# Patient Record
Sex: FEMALE | Race: WHITE | NOT HISPANIC OR LATINO | Employment: FULL TIME | URBAN - METROPOLITAN AREA
[De-identification: names, ages, dates, MRNs, and addresses within clinical notes are randomized per-mention and may not be internally consistent; named-entity substitution may affect disease eponyms.]

---

## 2023-12-05 ENCOUNTER — APPOINTMENT (OUTPATIENT)
Dept: OBSTETRICS AND GYNECOLOGY | Facility: CLINIC | Age: 23
End: 2023-12-05
Payer: COMMERCIAL

## 2023-12-11 ENCOUNTER — OFFICE VISIT (OUTPATIENT)
Dept: OBSTETRICS AND GYNECOLOGY | Facility: CLINIC | Age: 23
End: 2023-12-11
Payer: COMMERCIAL

## 2023-12-11 VITALS
DIASTOLIC BLOOD PRESSURE: 76 MMHG | WEIGHT: 293 LBS | HEIGHT: 66 IN | BODY MASS INDEX: 47.09 KG/M2 | SYSTOLIC BLOOD PRESSURE: 118 MMHG

## 2023-12-11 DIAGNOSIS — L68.0 HIRSUTISM: ICD-10-CM

## 2023-12-11 DIAGNOSIS — N92.6 IRREGULAR PERIODS: Primary | ICD-10-CM

## 2023-12-11 PROCEDURE — 1036F TOBACCO NON-USER: CPT | Performed by: OBSTETRICS & GYNECOLOGY

## 2023-12-11 PROCEDURE — 99385 PREV VISIT NEW AGE 18-39: CPT | Performed by: OBSTETRICS & GYNECOLOGY

## 2023-12-11 RX ORDER — LISDEXAMFETAMINE DIMESYLATE CAPSULES 20 MG/1
20 CAPSULE ORAL EVERY MORNING
COMMUNITY

## 2023-12-14 ENCOUNTER — LAB (OUTPATIENT)
Dept: LAB | Facility: LAB | Age: 23
End: 2023-12-14
Payer: COMMERCIAL

## 2023-12-14 ENCOUNTER — ANCILLARY PROCEDURE (OUTPATIENT)
Dept: RADIOLOGY | Facility: CLINIC | Age: 23
End: 2023-12-14
Payer: COMMERCIAL

## 2023-12-14 DIAGNOSIS — N92.6 IRREGULAR PERIODS: ICD-10-CM

## 2023-12-14 DIAGNOSIS — L68.0 HIRSUTISM: ICD-10-CM

## 2023-12-14 LAB
DHEA-S SERPL-MCNC: 164 UG/DL (ref 65–395)
EST. AVERAGE GLUCOSE BLD GHB EST-MCNC: 105 MG/DL
FSH SERPL-ACNC: 10.4 IU/L
GLUCOSE P FAST SERPL-MCNC: 89 MG/DL (ref 74–99)
HBA1C MFR BLD: 5.3 %
INSULIN P FAST SERPL-ACNC: 29 UIU/ML (ref 3–25)
LH SERPL-ACNC: 7.4 IU/L
PROLACTIN SERPL-MCNC: 7.3 UG/L (ref 3–20)
TSH SERPL-ACNC: 2.43 MIU/L (ref 0.44–3.98)

## 2023-12-14 PROCEDURE — 82947 ASSAY GLUCOSE BLOOD QUANT: CPT

## 2023-12-14 PROCEDURE — 84402 ASSAY OF FREE TESTOSTERONE: CPT

## 2023-12-14 PROCEDURE — 76856 US EXAM PELVIC COMPLETE: CPT | Performed by: RADIOLOGY

## 2023-12-14 PROCEDURE — 84146 ASSAY OF PROLACTIN: CPT

## 2023-12-14 PROCEDURE — 83002 ASSAY OF GONADOTROPIN (LH): CPT

## 2023-12-14 PROCEDURE — 83036 HEMOGLOBIN GLYCOSYLATED A1C: CPT

## 2023-12-14 PROCEDURE — 82627 DEHYDROEPIANDROSTERONE: CPT

## 2023-12-14 PROCEDURE — 83525 ASSAY OF INSULIN: CPT

## 2023-12-14 PROCEDURE — 84443 ASSAY THYROID STIM HORMONE: CPT

## 2023-12-14 PROCEDURE — 76856 US EXAM PELVIC COMPLETE: CPT

## 2023-12-14 PROCEDURE — 83001 ASSAY OF GONADOTROPIN (FSH): CPT

## 2023-12-14 PROCEDURE — 36415 COLL VENOUS BLD VENIPUNCTURE: CPT

## 2023-12-18 ENCOUNTER — TELEMEDICINE (OUTPATIENT)
Dept: OBSTETRICS AND GYNECOLOGY | Facility: CLINIC | Age: 23
End: 2023-12-18
Payer: COMMERCIAL

## 2023-12-18 VITALS — WEIGHT: 293 LBS | BODY MASS INDEX: 52.29 KG/M2

## 2023-12-18 DIAGNOSIS — E16.1 HYPERINSULINEMIA: ICD-10-CM

## 2023-12-18 DIAGNOSIS — N92.6 IRREGULAR PERIODS: Primary | ICD-10-CM

## 2023-12-18 DIAGNOSIS — L68.0 HIRSUTISM: ICD-10-CM

## 2023-12-18 PROCEDURE — 99213 OFFICE O/P EST LOW 20 MIN: CPT | Performed by: OBSTETRICS & GYNECOLOGY

## 2023-12-18 RX ORDER — DEXTROAMPHETAMINE SACCHARATE, AMPHETAMINE ASPARTATE, DEXTROAMPHETAMINE SULFATE AND AMPHETAMINE SULFATE 1.25; 1.25; 1.25; 1.25 MG/1; MG/1; MG/1; MG/1
5 TABLET ORAL DAILY
COMMUNITY
End: 2024-04-15 | Stop reason: WASHOUT

## 2023-12-18 RX ORDER — METFORMIN HYDROCHLORIDE 500 MG/1
500 TABLET ORAL
Qty: 30 TABLET | Refills: 2 | Status: SHIPPED | OUTPATIENT
Start: 2023-12-18 | End: 2023-12-19

## 2023-12-18 NOTE — PROGRESS NOTES
Subjective   Patient ID: Luna Alarcon is a 23 y.o. female who presents for Metrorrhagia (Patient states has been getting cramps like she is going to start her period but does not).  MARY ANN Carrasco is on telehealth with her mom for follow-up visit.  We reviewed today her pelvic ultrasound and labs.  As she only had abdominal scan done through which the uterus was normal and no fibroids but unable to see the ovaries.  No transvaginal exam was done.  Her labs all within normal limits her FSH to LH ratio is normal .  The only abnormality noticed was higher level of insulin and the ratio of glucose to insulin low at 3.  Review of Systems   All other systems reviewed and are negative.      Objective   Physical Exam  Constitutional:       Appearance: Normal appearance.   Pulmonary:      Effort: Pulmonary effort is normal.   Neurological:      Mental Status: She is alert.   Psychiatric:         Mood and Affect: Mood normal.         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  Have also recommended an nutritional referral to help her monitor her calories and lose weight.  Recommended 16% weight loss before we reevaluate labs.  We did discuss a pelvic ultrasound which was inadequate ovaries were not seen and so this can be followed up in 3 to 6 months if necessary with a transvaginal ultrasound.  Both patient and mom were present during this telehealth visit and their questions were answered.  Follow-up is in 1 to 2 months.  27 mins with majority of the time counseling, review results and making recommendation and coordinating care.        David Chávez MD  12/18/23 1:28 PM

## 2023-12-19 RX ORDER — METFORMIN HYDROCHLORIDE 500 MG/1
TABLET ORAL
Qty: 90 TABLET | Refills: 3 | Status: SHIPPED | OUTPATIENT
Start: 2023-12-19 | End: 2024-03-13 | Stop reason: SDUPTHER

## 2023-12-20 LAB
TESTOSTERONE FREE (CHAN): 3.4 PG/ML (ref 0.1–6.4)
TESTOSTERONE,TOTAL,LC-MS/MS: 16 NG/DL (ref 2–45)

## 2024-01-05 ENCOUNTER — NUTRITION (OUTPATIENT)
Dept: NUTRITION | Facility: CLINIC | Age: 24
End: 2024-01-05
Payer: COMMERCIAL

## 2024-01-05 DIAGNOSIS — E16.1 HYPERINSULINEMIA: ICD-10-CM

## 2024-01-05 DIAGNOSIS — Z71.3 DIETARY COUNSELING AND SURVEILLANCE: Primary | ICD-10-CM

## 2024-01-05 PROCEDURE — 97802 MEDICAL NUTRITION INDIV IN: CPT

## 2024-01-05 NOTE — PROGRESS NOTES
"NUTRITION ASSESSMENT NOTE    Reason for Nutrition Visit:  Pt is a 23 y.o. female being seen in person for an initial appointment at [facility] referred for OBESITY complicating childbirth and HYPERINSULINEMIA.     Pt states they seek  from dietitian for help addressing her weight status.     Anthropometrics:  Wt: 324#, 147kg  Ht: 5'6\"  BMI: 52.29 kg/m2      Past Medical Hx:  Patient Active Problem List   Diagnosis    Irregular periods    Obesity complicating childbirth    Hirsutism    Hyperinsulinemia        Lab Results   Component Value Date    HGBA1C 5.3 12/14/2023          Food and Nutrition Hx:  Pt admits to having struggled with weight her whole life. Pt was active in sports for years, but tore both ACL's during high school and lost the physical activity component. Pt then began college and her diet pattern degraded to the point where weight began to climb.     Pt currently starting walking at the gym x 30-60 minutes, plans to conduct it 3 days/week.     Pt feels like the biggest issue contributing to wt status is large portions, the types of foods, and lack of exercise.     DIETARY RECALL: *Pt consumes 2 meals/day on average*  Wake-up: Varied  Meal 1: Most days, varied times, Eggs, grilled chicken in jan wrap  Meal 2: Most days, Restaurant food  Meal 3: Everyday, Tater tot casserole, pork roast, mashed potatoes, corn, salad, chicken, roasted peppers, cabbage  Snacks: Hot Cheeto's  Beverages: Water x 64oz+ daily      NUTRITIONAL ARTIFACTS:  Typically has dinner style foods for breakfast    Allergies: None  Intolerance: None  Appetite: Good  Intake: >75%  GI Symptoms : None Frequency: absent  Swallowing Difficulty: No problems with swallowing  Dentition : own -- Good condition    Supplements: Denies -- Formerly fish oil and magensium    Energy Levels: Stable    Food Preparation: Patient  Cooking Skills/Barriers: None reported -- Likes to cook  Grocery Shopping: Patient      Nutrition Focused Physical " Exam:    Performed/Deferred: Deferred as pt visually appears well-nourished with no signs of malnutrition    Estimated Energy Needs:    WEIGHT MAINTENANCE: MSJ, 2255 x 1.2 (AF) = 2700 kcal/day  WEIGHT LOSS: 25-30 kcal/kg IBW = 0438-5313 kcal/day  PROTEIN Needs: 0.8-1 g/kg = 120-145 g/day    Nutrition Diagnosis:    Diagnosis Statement 1:  Diagnosis Status: New  Diagnosis : Obese related to  excessive energy intake over a prolonged period of time  as evidenced by  patient BMI of 52.29 kg/m2    Diagnosis Statement 2:  Diagnosis Status: New  Diagnosis : Inadequate fiber intake  related to food and nutrition related knowledge deficit concerning desirable quantities of fiber as evidenced by estimated intake of fiber that is insufficient when compared to recommended amounts (38 g/day for men and 25 g/day for women)    Diagnosis Statement 3:   Diagnosis Status: New  Diagnosis : Inadequate protein intake  related to food and nutrition related knowledge deficit concerning appropriate amount and type of dietary fat and/or protein as evidenced by  dietary recall reflecting intake of protein at levels <75% daily recommended needs x > 3 months    Nutrition Interventions:  Anti-Inflammatory Diet, Decreased Carbohydrate Diet, Decreased Fat Diet, Increased Fiber Diet, Increased Soluble Fiber, Increased Omega-3 Diet, Increased Protein Diet, Low Saturated Fat Diet, Mediterranean Diet, and Plant Based Diet  Nutrition Counseling: Motivational Interviewing  Coordination of Care: None    Nutrition Goals:  Nutrition Goals: Adequate fluid intake: 80oz+ water daily  Consistent meal/snack pattern  Decrease intake of added sugars  Decrease intake of saturated fats  Increase awareness and respond to hunger cues  Increase awareness and respond to satiety cues  Initiate Exercise Regimen  Weight Loss  Fruits: Increase  Vegetables: Increase  Whole Grains: Increase  Dairy/Calcium Foods: Increase  Meat/Protein Foods: Increase  Sugary Drinks:  "decrease  Sweets: decrease  Fried Foods: decrease  High Fats: decrease  Beans/Lentils: Increase    Nutrition Recommendations:  1) Consider following a Mediterranean approach to your dietary pattern to help improve health and reduce risk/manage chronic disease. The Mediterranean dietary pattern focuses on eating more unprocessed foods with frequent and regular consumption of fresh fruits, fresh vegetables, whole grains, legumes/beans, nuts, fish and seafood, poultry, eggs, low fat cheese and dairy (cottage cheese, Greek yogurt, and kefir), and use heart healthy oils like cold-pressed extra-virgin olive oil and minimally refined avocado oil. Include variety and color to match the rainbow within your diet by opting for fresh or frozen varieties of fruits and vegetables that you can regularly include at your meals and snacks.  2) Include physical activity into your day with a goal to meet 150 minutes of moderate-intensity aerobic activity (walking, biking, swimming, or housework) every week. Strength-training or weight-bearing exercise should be included twice a week if you are physically able to.  3) Begin building and incorporating \"Nourish Bowls\" into your dietary patterning. This powerful phytonutrient bowl helps deliver an exceptional array of phytonutrients, vitamins, minerals, lean protein, fiber-rich carbohydrates, heart healthy fats, and immune boosting antioxidants. You can eat the nourish bowls for lunch as an excellent way to promote nutrient intake midday, or you can eat them in the evening to promote lasting satiety that prevents overconsumption of snacks or other foods at night.  4) Prioritizing your protein helps control appetite and helps to maintain lean (muscular) body mass while helping with weight loss, thus it is important to ensure you that you eat enough protein each day. Do not sacrifice protein to reduce calories, and remember to try to get approximately 25-30 grams or more with each meal you " eat in the day. Optimize protein QUALITY - chicken, turkey, fish/seafood, tofu/plant-based options, lean beef, egg whites - as the fattier options will slow or prevent weight loss. Pairing quality protein with fibrous vegetables and fruits will work wonders in managing metabolic functioning that will ultimately help with weight loss.    Educational Handouts: PBP, DGERASTO, ACLM NB's, ADA My Plate    Readiness to Change : Good  Level of Understanding : Good  Anticipated Compliant : Good

## 2024-03-13 DIAGNOSIS — E16.1 HYPERINSULINEMIA: ICD-10-CM

## 2024-03-13 RX ORDER — METFORMIN HYDROCHLORIDE 500 MG/1
TABLET ORAL
Qty: 90 TABLET | Refills: 1 | Status: SHIPPED | OUTPATIENT
Start: 2024-03-13 | End: 2024-04-15

## 2024-03-13 NOTE — TELEPHONE ENCOUNTER
Rx Refill Request Telephone Encounter    Name:  Luna Alarcon  :  [id number]  Medication Name: Metformin    500 mg           Specific [facility]  Date of last appointment:  2023  Date of next appointment:  04/15/2024  Best number to reach patient:  [phone]

## 2024-04-15 ENCOUNTER — TELEMEDICINE (OUTPATIENT)
Dept: OBSTETRICS AND GYNECOLOGY | Facility: CLINIC | Age: 24
End: 2024-04-15
Payer: COMMERCIAL

## 2024-04-15 VITALS — HEIGHT: 65 IN | BODY MASS INDEX: 48.82 KG/M2 | WEIGHT: 293 LBS

## 2024-04-15 DIAGNOSIS — E66.01 CLASS 3 SEVERE OBESITY DUE TO EXCESS CALORIES WITHOUT SERIOUS COMORBIDITY WITH BODY MASS INDEX (BMI) OF 50.0 TO 59.9 IN ADULT (MULTI): ICD-10-CM

## 2024-04-15 DIAGNOSIS — E16.1 HYPERINSULINEMIA: Primary | ICD-10-CM

## 2024-04-15 PROCEDURE — 1036F TOBACCO NON-USER: CPT | Performed by: OBSTETRICS & GYNECOLOGY

## 2024-04-15 PROCEDURE — 99442 PR PHYS/QHP TELEPHONE EVALUATION 11-20 MIN: CPT | Performed by: OBSTETRICS & GYNECOLOGY

## 2024-04-15 PROCEDURE — 3008F BODY MASS INDEX DOCD: CPT | Performed by: OBSTETRICS & GYNECOLOGY

## 2024-04-15 RX ORDER — MELOXICAM 15 MG
15 TABLET ORAL
COMMUNITY
Start: 2024-04-03 | End: 2025-04-03

## 2024-04-15 RX ORDER — EPINEPHRINE 0.3 MG/.3ML
0.3 INJECTION SUBCUTANEOUS
COMMUNITY
Start: 2023-04-20

## 2024-04-15 RX ORDER — MONTELUKAST SODIUM 5 MG/1
TABLET, CHEWABLE ORAL EVERY 24 HOURS
COMMUNITY

## 2024-04-15 RX ORDER — SERTRALINE HYDROCHLORIDE 100 MG/1
100 TABLET, FILM COATED ORAL
COMMUNITY
Start: 2024-04-03 | End: 2025-04-03

## 2024-04-15 RX ORDER — METFORMIN HYDROCHLORIDE 1000 MG/1
TABLET ORAL
Qty: 90 TABLET | Refills: 1 | Status: SHIPPED | OUTPATIENT
Start: 2024-04-15

## 2024-04-15 NOTE — PROGRESS NOTES
Subjective   Patient ID: Luna Alarcon is a 23 y.o. female who presents for Follow-up (Followup stated havent had any bad side effects and thinks the medication was helping ).  MAYR ANN Carrasco is a 23 years old G0 never been sexually active with history of amenorrhea and hyperinsulinemia was started on metformin 500 mg daily in January is here on phone visit for follow-up.  She is tolerating metformin without any side effects.  She has not lost any weight.  She has not been very active and says that he went to weight loss clinic in had some counseling and strong to modify her diet.  She reports LMP mid March.  We did review again her labs and pelvic ultrasound from last visit and that she denies any pelvic pain or any new symptoms and to hold off on a repeat pelvic ultrasound for now.  I have recommended that we increase the metformin to 1000 mg daily.  She was instructed to start taking it twice a day and a new prescription for 1000 mg extended release metformin will be sent to her pharmacy.  She is encouraged to make an appointment and come in for her first pelvic and Pap smear soon.    Review of Systems   All other systems reviewed and are negative.      Objective   Physical Exam  Neurological:      Mental Status: She is alert.   Psychiatric:         Mood and Affect: Mood normal.       Assessment/Plan   Problem List Items Addressed This Visit             ICD-10-CM    Hyperinsulinemia - Primary E16.1     Other Visit Diagnoses         Codes    Class 3 severe obesity due to excess calories without serious comorbidity with body mass index (BMI) of 50.0 to 59.9 in adult (Multi)     E66.01, Z68.43        New prescription for metformin 1000 mg daily was sent to her pharmacy.  I have encouraged her to try different diets and incorporate regular exercises to her daily activities.  Have recommended that she discuss possible use of medication with her primary care physician for weight loss such as Watson or Jeremy.    F    ollow-up this year for first pelvic and Pap smear.         David Chávez MD 04/15/24 11:49 AM

## 2024-04-26 ENCOUNTER — APPOINTMENT (OUTPATIENT)
Dept: PRIMARY CARE | Facility: CLINIC | Age: 24
End: 2024-04-26
Payer: COMMERCIAL

## 2024-10-06 DIAGNOSIS — E16.1 HYPERINSULINEMIA: ICD-10-CM

## 2024-10-07 RX ORDER — METFORMIN HYDROCHLORIDE 1000 MG/1
TABLET ORAL
Qty: 90 TABLET | Refills: 1 | Status: SHIPPED | OUTPATIENT
Start: 2024-10-07

## 2025-01-11 ENCOUNTER — OFFICE VISIT (OUTPATIENT)
Dept: URGENT CARE | Age: 25
End: 2025-01-11
Payer: COMMERCIAL

## 2025-01-11 VITALS
OXYGEN SATURATION: 100 % | SYSTOLIC BLOOD PRESSURE: 155 MMHG | RESPIRATION RATE: 16 BRPM | DIASTOLIC BLOOD PRESSURE: 83 MMHG | HEART RATE: 94 BPM | TEMPERATURE: 97.6 F

## 2025-01-11 DIAGNOSIS — J02.9 SORE THROAT: ICD-10-CM

## 2025-01-11 DIAGNOSIS — K04.7 DENTAL INFECTION: Primary | ICD-10-CM

## 2025-01-11 DIAGNOSIS — B37.0 THRUSH, ORAL: ICD-10-CM

## 2025-01-11 PROCEDURE — 99203 OFFICE O/P NEW LOW 30 MIN: CPT | Performed by: NURSE PRACTITIONER

## 2025-01-11 PROCEDURE — 1036F TOBACCO NON-USER: CPT | Performed by: NURSE PRACTITIONER

## 2025-01-11 RX ORDER — NYSTATIN 100000 [USP'U]/ML
SUSPENSION ORAL
Qty: 200 ML | Refills: 0 | Status: SHIPPED | OUTPATIENT
Start: 2025-01-11

## 2025-01-11 RX ORDER — AMOXICILLIN AND CLAVULANATE POTASSIUM 875; 125 MG/1; MG/1
1 TABLET, FILM COATED ORAL 2 TIMES DAILY
Qty: 14 TABLET | Refills: 0 | Status: SHIPPED | OUTPATIENT
Start: 2025-01-11 | End: 2025-01-18

## 2025-01-11 RX ORDER — NYSTATIN 100000 [USP'U]/ML
5 SUSPENSION ORAL 4 TIMES DAILY
Qty: 200 ML | Refills: 0 | Status: SHIPPED | OUTPATIENT
Start: 2025-01-11 | End: 2025-01-11 | Stop reason: SDUPTHER

## 2025-01-11 ASSESSMENT — ENCOUNTER SYMPTOMS
HEADACHES: 0
NECK PAIN: 0
VOMITING: 0
DIARRHEA: 0
SHORTNESS OF BREATH: 0
TROUBLE SWALLOWING: 0
COUGH: 1
SWOLLEN GLANDS: 0
ABDOMINAL PAIN: 0
STRIDOR: 0
SORE THROAT: 1
HOARSE VOICE: 0

## 2025-01-11 NOTE — PROGRESS NOTES
Subjective   Patient ID: Luna Alarcon is a 24 y.o. female. They present today with a chief complaint of Sore Throat (Sore throat, cough since Wednesday/On amoxicillin 500 mg TID for dental extraction).    History of Present Illness  Pt had one right upper molar extracted recently and currently on amoxicillin 500 mg TID. Pt c/o pain, redness, and tenderness in the right cheek as well as a sore throat for 1 day. Gargling with salty and water and taking ibuprofen did not improve the symptoms.       History provided by:  Patient   used: No    Sore Throat   This is a new problem. Episode onset: 2 days. The problem has been unchanged. There has been no fever. The pain is moderate. Associated symptoms include coughing (NP). Pertinent negatives include no abdominal pain, congestion, diarrhea, drooling, ear discharge, ear pain, headaches, hoarse voice, plugged ear sensation, neck pain, shortness of breath, stridor, swollen glands, trouble swallowing or vomiting. Associated symptoms comments: White patches on tongue started a couple of days after she started amoxicillin.. She has tried NSAIDs for the symptoms. The treatment provided no relief.       Past Medical History  Allergies as of 01/11/2025 - Reviewed 01/11/2025   Allergen Reaction Noted    Bee venom protein (honey bee) Anaphylaxis 12/11/2023    Oxycodone GI Upset 12/11/2023    Percocet [oxycodone-acetaminophen] GI Upset 12/11/2023       (Not in a hospital admission)       Past Medical History:   Diagnosis Date    Personal history of other diseases of the digestive system 08/07/2018    History of gastroesophageal reflux (GERD)    Personal history of other diseases of the respiratory system 08/07/2018    History of asthma    Personal history of other diseases of the respiratory system 08/21/2022    History of allergic rhinitis       Past Surgical History:   Procedure Laterality Date    ANTERIOR CRUCIATE LIGAMENT REPAIR Bilateral     KNEE CARTILAGE  SURGERY      TONSILLECTOMY  08/07/2018    Tonsillectomy With Adenoidectomy        reports that she has never smoked. She has never used smokeless tobacco. She reports that she does not currently use alcohol. She reports that she does not use drugs.    Review of Systems  Review of Systems   HENT:  Positive for sore throat. Negative for congestion, drooling, ear discharge, ear pain, hoarse voice and trouble swallowing.    Respiratory:  Positive for cough (NP). Negative for shortness of breath and stridor.    Gastrointestinal:  Negative for abdominal pain, diarrhea and vomiting.   Musculoskeletal:  Negative for neck pain.   Neurological:  Negative for headaches.          Objective    Vitals:    01/11/25 1728   BP: 155/83   Pulse: 94   Resp: 16   Temp: 36.4 °C (97.6 °F)   SpO2: 100%     No LMP recorded.    Physical Exam  Vitals and nursing note reviewed.   Constitutional:       Appearance: Normal appearance.   HENT:      Head: Normocephalic and atraumatic.        Right Ear: Hearing, tympanic membrane, ear canal and external ear normal.      Left Ear: Hearing, tympanic membrane, ear canal and external ear normal.      Nose: Nose normal. No nasal deformity, septal deviation, signs of injury, laceration, nasal tenderness, mucosal edema, congestion or rhinorrhea.      Right Sinus: No maxillary sinus tenderness or frontal sinus tenderness.      Left Sinus: No maxillary sinus tenderness or frontal sinus tenderness.      Mouth/Throat:      Lips: Pink.      Mouth: Mucous membranes are moist.      Pharynx: Oropharynx is clear. Uvula midline.      Tonsils: No tonsillar exudate or tonsillar abscesses.     Cardiovascular:      Rate and Rhythm: Normal rate and regular rhythm.      Heart sounds: Normal heart sounds.   Pulmonary:      Effort: Pulmonary effort is normal.      Breath sounds: Normal breath sounds and air entry.   Musculoskeletal:      Cervical back: Normal range of motion and neck supple.   Lymphadenopathy:       Cervical: No cervical adenopathy.   Neurological:      Mental Status: She is alert.   Psychiatric:         Mood and Affect: Mood normal.         Behavior: Behavior normal.         Procedures    Point of Care Test & Imaging Results from this visit  No results found for this visit on 01/11/25.   No results found.    Diagnostic study results (if any) were reviewed by JESUS Larios.    Assessment/Plan   Allergies, medications, history, and pertinent labs/EKGs/Imaging reviewed by JESUS Larios.     Medical Decision Making  Stop amoxicillin 500 mg. Start Augmentin 875/125 mg twice daily for 7 days.   Take nystatin suspension as instructed.       Orders and Diagnoses  Diagnoses and all orders for this visit:  Dental infection  -     amoxicillin-pot clavulanate (Augmentin) 875-125 mg tablet; Take 1 tablet by mouth 2 times a day for 7 days.  Thrush, oral  -     nystatin (Mycostatin) 100,000 unit/mL suspension; Swish and swallow 5 mL by mouth 4 times daily for 10 days  Sore throat      Medical Admin Record      Patient disposition: Home    Electronically signed by JESUS Larios  5:52 PM

## 2025-05-05 ENCOUNTER — OFFICE VISIT (OUTPATIENT)
Dept: URGENT CARE | Age: 25
End: 2025-05-05
Payer: COMMERCIAL

## 2025-05-05 VITALS
OXYGEN SATURATION: 98 % | SYSTOLIC BLOOD PRESSURE: 138 MMHG | DIASTOLIC BLOOD PRESSURE: 57 MMHG | RESPIRATION RATE: 18 BRPM | TEMPERATURE: 97.9 F | HEART RATE: 76 BPM

## 2025-05-05 DIAGNOSIS — J32.0 MAXILLARY SINUSITIS, UNSPECIFIED CHRONICITY: Primary | ICD-10-CM

## 2025-05-05 PROCEDURE — 1036F TOBACCO NON-USER: CPT

## 2025-05-05 PROCEDURE — 99213 OFFICE O/P EST LOW 20 MIN: CPT

## 2025-05-05 RX ORDER — AZITHROMYCIN 250 MG/1
TABLET, FILM COATED ORAL
Qty: 6 TABLET | Refills: 0 | Status: SHIPPED | OUTPATIENT
Start: 2025-05-05 | End: 2025-05-10

## 2025-05-05 RX ORDER — ALBUTEROL SULFATE 90 UG/1
2 INHALANT RESPIRATORY (INHALATION) EVERY 4 HOURS PRN
Qty: 8 G | Refills: 0 | Status: SHIPPED | OUTPATIENT
Start: 2025-05-05 | End: 2026-05-05

## 2025-05-05 NOTE — PROGRESS NOTES
Subjective   Patient ID: Luna Alarcon is a 24 y.o. female. They present today with a chief complaint of Sinus Problem (Pt advised that she has had a cough, nasal congestion, post nasal drip, sore throat, and bi lateral earache for the past 1 1/2 weeks. ).    History of Present Illness  Kun Alarcon is a 24 y.o. female. They present today with a chief complaint of Sinus Problem (Pt advised that she has had a cough, nasal congestion, post nasal drip, sore throat, and bi lateral earache for the past 1 1/2 weeks. ).    Past Medical History  Allergies as of 05/05/2025 - Reviewed 05/05/2025   Allergen Reaction Noted    Bee venom protein (honey bee) Anaphylaxis 12/11/2023    Oxycodone GI Upset 12/11/2023    Percocet [oxycodone-acetaminophen] GI Upset 12/11/2023       Prescriptions Prior to Admission[1]     Medical History[2]    Surgical History[3]     reports that she has never smoked. She has never used smokeless tobacco. She reports that she does not currently use alcohol. She reports that she does not use drugs.    Review of Systems  Review of Systems    Cough, congestion, sinus pressure, postnasal drip  Objective    Vitals:    05/05/25 0825   BP: 138/57   Pulse: 76   Resp: 18   Temp: 36.6 °C (97.9 °F)   SpO2: 98%     No LMP recorded.    Physical Exam  Inflamed nasal turbinates, erythematous pharynx  Procedures    Point of Care Test & Imaging Results from this visit  No results found for this visit on 05/05/25.   Imaging  No results found.    Cardiology, Vascular, and Other Imaging  No other imaging results found for the past 2 days      Diagnostic study results (if any) were reviewed by JESUS Penn.    Assessment/Plan   Allergies, medications, history, and pertinent labs/EKGs/Imaging reviewed by JESUS Penn.     Medical Decision Making  Signs and symptoms of bacterial sinusitis.  Azithromycin sent with an albuterol inhaler.  I encouraged continuation of her prescribed allergy  medications.  Over-the-counter medications as discussed as well.  Follow-up with primary care provider.    As a result of the work-up, the patient was discharged home.  she was informed of her diagnosis and instructed to come back with any concerns or worsening of condition.  she and was agreeable to the plan as discussed above.  she was given the opportunity to ask questions.  All of the patient's questions were answered.    This document was generated using the assistance of voice recognition software. If there are any errors of spelling, grammar, syntax, or meaning; please feel free to contact me directly for clarification.     Orders and Diagnoses  Diagnoses and all orders for this visit:  Maxillary sinusitis, unspecified chronicity  -     azithromycin (Zithromax) 250 mg tablet; Take 2 tabs (500 mg) by mouth today, than 1 daily for 4 days.  -     albuterol (Ventolin HFA) 90 mcg/actuation inhaler; Inhale 2 puffs every 4 hours if needed for wheezing or shortness of breath.      Medical Admin Record      Patient disposition: Home    Electronically signed by JESUS Penn  8:36 AM           [1] (Not in a hospital admission)   [2]   Past Medical History:  Diagnosis Date    Personal history of other diseases of the digestive system 08/07/2018    History of gastroesophageal reflux (GERD)    Personal history of other diseases of the respiratory system 08/07/2018    History of asthma    Personal history of other diseases of the respiratory system 08/21/2022    History of allergic rhinitis   [3]   Past Surgical History:  Procedure Laterality Date    ANTERIOR CRUCIATE LIGAMENT REPAIR Bilateral     KNEE CARTILAGE SURGERY      TONSILLECTOMY  08/07/2018    Tonsillectomy With Adenoidectomy